# Patient Record
Sex: FEMALE | URBAN - METROPOLITAN AREA
[De-identification: names, ages, dates, MRNs, and addresses within clinical notes are randomized per-mention and may not be internally consistent; named-entity substitution may affect disease eponyms.]

---

## 2022-10-10 ENCOUNTER — APPOINTMENT (OUTPATIENT)
Dept: URBAN - METROPOLITAN AREA SURGERY 17 | Age: 87
Setting detail: DERMATOLOGY
End: 2022-10-11

## 2022-10-10 VITALS
HEIGHT: 61 IN | TEMPERATURE: 98.4 F | WEIGHT: 138 LBS | RESPIRATION RATE: 16 BRPM | HEART RATE: 70 BPM | DIASTOLIC BLOOD PRESSURE: 58 MMHG | SYSTOLIC BLOOD PRESSURE: 112 MMHG

## 2022-10-10 PROBLEM — C44.42 SQUAMOUS CELL CARCINOMA OF SKIN OF SCALP AND NECK: Status: ACTIVE | Noted: 2022-10-10

## 2022-10-10 PROCEDURE — OTHER MOHS SURGERY: OTHER

## 2022-10-10 PROCEDURE — 17311 MOHS 1 STAGE H/N/HF/G: CPT

## 2022-10-10 PROCEDURE — OTHER CONSULTATION FOR MOHS SURGERY: OTHER

## 2022-10-10 NOTE — PROCEDURE: MOHS SURGERY

## 2022-10-10 NOTE — PROCEDURE: MOHS SURGERY
- Best Possible Medication History


Admit Date and Time: 06/08/21 2031


Processed by: Nursing


Medication History completed: Yes


Secondary Source(s): Insurance records


Med list confirmed by RN





As the person ultimately responsible for medication therapy, providers are able 

to order a medication from an existing home medication list in [facility] via the 

"Reconcile Routine" prior to Confirmation of that medication by support staff. 

Such practice is discouraged except when the physician, in their clinical 

judgment, deems that a medical need exists for a medication without regard to 

previous use. Afx Histology Text: Atypical cells are present in the dermis (see map). See map. 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 Occasional giant cells are seen.

## 2022-10-10 NOTE — PROCEDURE: CONSULTATION FOR MOHS SURGERY
Detail Level: Detailed
Body Location Override (Optional - Billing Will Still Be Based On Selected Body Map Location If Applicable): crown of scalp
Size Of Lesion: 1.1
Name Of The Referring Provider For Procedure: Dr. Miranda
Incorporate Mauc In Note: Yes

## 2022-10-10 NOTE — PROCEDURE: MOHS SURGERY

## 2022-10-10 NOTE — PROCEDURE: MOHS SURGERY
PER INSURANCE MUST TRY AND FAIL BOTH ONGLYZA AND TRADJENTA   Melolabial Interpolation Flap Text: A decision was made to reconstruct the defect utilizing an interpolation axial flap and a staged reconstruction.  A telfa template was made of the defect.  This telfa template was then used to outline the melolabial interpolation flap.  The donor area for the pedicle flap was then injected with anesthesia.  The flap was excised through the skin and subcutaneous tissue down to the layer of the underlying musculature.  The pedicle flap was carefully excised within this deep plane to maintain its blood supply.  The edges of the donor site were undermined.   The donor site was closed in a primary fashion.  The pedicle was then rotated into position and sutured.  Once the tube was sutured into place, adequate blood supply was confirmed with blanching and refill.  The pedicle was then wrapped with xeroform gauze and dressed appropriately with a telfa and gauze bandage to ensure continued blood supply and protect the attached pedicle.

## 2022-10-10 NOTE — PROCEDURE: MOHS SURGERY

## 2022-10-10 NOTE — PROCEDURE: MOHS SURGERY
Body Location Override (Optional - Billing Will Still Be Based On Selected Body Map Location If Applicable): crown of scalp

## 2023-01-23 ENCOUNTER — APPOINTMENT (OUTPATIENT)
Dept: URBAN - METROPOLITAN AREA SURGERY 17 | Age: 88
Setting detail: DERMATOLOGY
End: 2023-01-24

## 2023-01-23 VITALS
SYSTOLIC BLOOD PRESSURE: 106 MMHG | RESPIRATION RATE: 16 BRPM | WEIGHT: 138 LBS | DIASTOLIC BLOOD PRESSURE: 68 MMHG | TEMPERATURE: 98.1 F | HEIGHT: 61 IN | HEART RATE: 60 BPM

## 2023-01-23 DIAGNOSIS — Z85.828 PERSONAL HISTORY OF OTHER MALIGNANT NEOPLASM OF SKIN: ICD-10-CM

## 2023-01-23 PROBLEM — D04.4 CARCINOMA IN SITU OF SKIN OF SCALP AND NECK: Status: ACTIVE | Noted: 2023-01-23

## 2023-01-23 PROCEDURE — OTHER MALIGNANT DESTRUCTION WITH CO2 LASER: OTHER

## 2023-01-23 PROCEDURE — 17272 DSTR MAL LES S/N/H/F/G 1.1-2: CPT

## 2023-01-23 PROCEDURE — OTHER MIPS QUALITY: OTHER

## 2023-01-23 PROCEDURE — OTHER COUNSELING: OTHER

## 2023-01-23 PROCEDURE — 99213 OFFICE O/P EST LOW 20 MIN: CPT | Mod: 25

## 2023-01-23 PROCEDURE — OTHER DESTRUCTION WITH CO2 LASER: OTHER

## 2023-01-23 NOTE — PROCEDURE: MALIGNANT DESTRUCTION WITH CO2 LASER
Body Location Override (Optional - Billing Will Still Be Based On Selected Body Map Location If Applicable): center frontal scalp
Detail Level: Detailed
Size Of Lesion In Cm (Required): 1.6
X Size Of Lesion In Cm (Optional): 1.5
Was Feathering Performed?: No
Anesthesia Type: 0.5% lidocaine with 1:200,000 epinephrine
Anesthesia Volume In Cc: 3
External Cooling Fan Speed: 5
Laser Type: UltraPulse Fractional CO2 laser
Laser Mode: Pulsed
Wavelength: 10,600nm
Power (Cason-Leave At Zero If Unwanted): 40
Energy(Mj-Leave At Zero If Unwanted): 200
Spot Sizes: 5mm
Pulse Duration (Usec): 100
Feathering Statement: Following the treatment the wound edges were feathered using 260mJ.
Post-Care Statement: Following the procedure, vaseline and a pressure bandage with telfa were applied.
Consent: Written consent obtained, risks reviewed including but not limited to crusting, scabbing, blistering, scarring, darker or lighter pigmentary change, incomplete improvement, infection and bleeding.
Post-Care Instructions: I reviewed with the patient in detail post-care instructions. Patient should avoid sun exposure until the area is fully healed and for several months after to avoid hyperpigmentation. Patient should apply vaseline to treated areas and bandage as instructed. A detailed wound care handout was provided and reviewed.
Bill As A Line Item Or As Units: Line Item